# Patient Record
(demographics unavailable — no encounter records)

---

## 2024-12-16 NOTE — HISTORY OF PRESENT ILLNESS
[FreeTextEntry1] : 49F with HTN for follow up   PCP: Dr Mima Regan   Tolerating current BP regimen  Home BP mostly 120s-130s Was elevated by PCP in September Admits to having headache that morning Losartan 50mg added, no longer taking once Rx ran out, notes home BP readings have been OK   Otherwise feeling generally well. Patient denies chest pain, shortness of breath, palpitations, dizziness, lightheadedness, syncope. Weight has been stable. Walks daily.   HISTORY:  Initially seen during her first trimester of pregnancy for HTN, heart murmur.  Subsequent early delivery at 30 weeks 5/19/19, since then has felt fine without CP, SOB.  Labetalol was too expensive so pt was started on amlodipine instead.  Hydralazine d/c'ed Had to stop due to LE edema and was placed on HCTZ.  Now being restarted on labetalol as price became more affordable. Remains on HCTZ.   Nonsmoker.  Denies family hx of CAD.  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

## 2024-12-16 NOTE — DISCUSSION/SUMMARY
[FreeTextEntry1] : 49F with HTN, obesity, murmur  HTN: Elevated today x 2. BP readings at home average 120s-130s/80s  -Continue labetalol 300mg BID -Will restart losartan 50mg  -Weight loss  Murmur: Flow murmur, echo without valvular disease. Slight ECG changes noted. Last TTE 2022, with murmur, would recommend repeat TTE   RV 6M

## 2024-12-16 NOTE — PHYSICAL EXAM
